# Patient Record
Sex: MALE | Race: WHITE | NOT HISPANIC OR LATINO | Employment: FULL TIME | URBAN - METROPOLITAN AREA
[De-identification: names, ages, dates, MRNs, and addresses within clinical notes are randomized per-mention and may not be internally consistent; named-entity substitution may affect disease eponyms.]

---

## 2017-04-30 ENCOUNTER — HOSPITAL ENCOUNTER (EMERGENCY)
Facility: HOSPITAL | Age: 42
Discharge: HOME | End: 2017-04-30
Attending: EMERGENCY MEDICINE
Payer: COMMERCIAL

## 2017-04-30 VITALS
DIASTOLIC BLOOD PRESSURE: 80 MMHG | SYSTOLIC BLOOD PRESSURE: 146 MMHG | HEIGHT: 72 IN | BODY MASS INDEX: 35.21 KG/M2 | OXYGEN SATURATION: 95 % | RESPIRATION RATE: 20 BRPM | TEMPERATURE: 98 F | WEIGHT: 260 LBS | HEART RATE: 61 BPM

## 2017-04-30 DIAGNOSIS — N49.2 SCROTAL WALL ABSCESS: Primary | ICD-10-CM

## 2017-04-30 PROCEDURE — 99283 EMERGENCY DEPT VISIT LOW MDM: CPT | Mod: 25

## 2017-04-30 PROCEDURE — 25000003 PHARM REV CODE 250: Performed by: EMERGENCY MEDICINE

## 2017-04-30 PROCEDURE — 63600175 PHARM REV CODE 636 W HCPCS: Performed by: EMERGENCY MEDICINE

## 2017-04-30 PROCEDURE — 90715 TDAP VACCINE 7 YRS/> IM: CPT | Performed by: EMERGENCY MEDICINE

## 2017-04-30 PROCEDURE — 55100 DRAINAGE OF SCROTUM ABSCESS: CPT

## 2017-04-30 PROCEDURE — 90471 IMMUNIZATION ADMIN: CPT | Performed by: EMERGENCY MEDICINE

## 2017-04-30 PROCEDURE — 96372 THER/PROPH/DIAG INJ SC/IM: CPT

## 2017-04-30 RX ORDER — LIDOCAINE HYDROCHLORIDE 10 MG/ML
1 INJECTION INFILTRATION; PERINEURAL
Status: COMPLETED | OUTPATIENT
Start: 2017-04-30 | End: 2017-04-30

## 2017-04-30 RX ORDER — FENOFIBRATE 160 MG/1
160 TABLET ORAL DAILY
COMMUNITY
End: 2018-08-01

## 2017-04-30 RX ORDER — HYDROCODONE BITARTRATE AND ACETAMINOPHEN 7.5; 325 MG/1; MG/1
1 TABLET ORAL EVERY 4 HOURS PRN
Qty: 15 TABLET | Refills: 0 | Status: SHIPPED | OUTPATIENT
Start: 2017-04-30 | End: 2018-08-01

## 2017-04-30 RX ORDER — HYDRALAZINE HYDROCHLORIDE 50 MG/1
50 TABLET, FILM COATED ORAL 3 TIMES DAILY
COMMUNITY

## 2017-04-30 RX ORDER — MORPHINE SULFATE 4 MG/ML
8 INJECTION, SOLUTION INTRAMUSCULAR; INTRAVENOUS
Status: COMPLETED | OUTPATIENT
Start: 2017-04-30 | End: 2017-04-30

## 2017-04-30 RX ORDER — ALPRAZOLAM 0.5 MG/1
0.5 TABLET ORAL 2 TIMES DAILY PRN
COMMUNITY

## 2017-04-30 RX ORDER — TIZANIDINE 4 MG/1
4 TABLET ORAL 3 TIMES DAILY PRN
COMMUNITY

## 2017-04-30 RX ORDER — GLYBURIDE 5 MG/1
10 TABLET ORAL 2 TIMES DAILY WITH MEALS
COMMUNITY

## 2017-04-30 RX ORDER — GABAPENTIN 600 MG/1
600 TABLET ORAL 2 TIMES DAILY
COMMUNITY

## 2017-04-30 RX ORDER — IBUPROFEN AND FAMOTIDINE 26.6; 8 MG/1; MG/1
TABLET ORAL 3 TIMES DAILY PRN
COMMUNITY

## 2017-04-30 RX ORDER — SILDENAFIL CITRATE 20 MG/1
20 TABLET ORAL DAILY
COMMUNITY
End: 2018-08-01

## 2017-04-30 RX ORDER — CLINDAMYCIN HYDROCHLORIDE 300 MG/1
300 CAPSULE ORAL EVERY 6 HOURS
COMMUNITY
End: 2018-08-01

## 2017-04-30 RX ORDER — ROSUVASTATIN CALCIUM 40 MG/1
10 TABLET, COATED ORAL NIGHTLY
COMMUNITY

## 2017-04-30 RX ADMIN — MORPHINE SULFATE 8 MG: 4 INJECTION, SOLUTION INTRAMUSCULAR; INTRAVENOUS at 11:04

## 2017-04-30 RX ADMIN — LIDOCAINE HYDROCHLORIDE 1 ML: 10 INJECTION, SOLUTION INFILTRATION; PERINEURAL at 11:04

## 2017-04-30 RX ADMIN — CLOSTRIDIUM TETANI TOXOID ANTIGEN (FORMALDEHYDE INACTIVATED), CORYNEBACTERIUM DIPHTHERIAE TOXOID ANTIGEN (FORMALDEHYDE INACTIVATED), BORDETELLA PERTUSSIS TOXOID ANTIGEN (GLUTARALDEHYDE INACTIVATED), BORDETELLA PERTUSSIS FILAMENTOUS HEMAGGLUTININ ANTIGEN (FORMALDEHYDE INACTIVATED), BORDETELLA PERTUSSIS PERTACTIN ANTIGEN, AND BORDETELLA PERTUSSIS FIMBRIAE 2/3 ANTIGEN 0.5 ML: 5; 2; 2.5; 5; 3; 5 INJECTION, SUSPENSION INTRAMUSCULAR at 11:04

## 2017-04-30 NOTE — ED NOTES
Pt resting in ER stretcher, aaox4, rr e/u, NAD noted. Bed low and locked, call light in reach, side rails up x2. Pt verbalized understanding of status and POC; denies further needs. Will continue to monitor.

## 2017-04-30 NOTE — ED PROVIDER NOTES
"SCRIBE #1 NOTE: I, Petty Holden, am scribing for, and in the presence of, Stanislav Pope MD. I have scribed the entire note.      History      Chief Complaint   Patient presents with    Abscess     Pt states, "I think I have a bad Staph infection on my scrotum."       Review of patient's allergies indicates:  No Known Allergies     HPI   HPI    4/30/2017, 11:00 AM   History obtained from the patient      History of Present Illness: Eddie Roberson Jr. is a 41 y.o. male patient who presents to the Emergency Department for right scrotal abscess which onset gradually 2 weeks ago. Sxs are constant and moderate in severity. Pt reports abscess started as a pimple on his scrotum. Prior tx includes Clindamycin prescribed by PCP 4 days ago. There are no mitigating or exacerbating factors noted. No associated sxs.  Pt denies any fever, N/V/D, and all other sxs at this time. No further complaints or concerns at this time.       Arrival mode: Personal vehicle    PCP: SARAHI Wilkins Jr, NP       Past Medical History:  Past Medical History:   Diagnosis Date    Chronic back pain     Diabetes mellitus     Hypertension     Neuropathy        Past Surgical History:  History reviewed. No pertinent surgical history.      Family History:  History reviewed. No pertinent family history.    Social History:  Social History     Social History Main Topics    Smoking status: Current Every Day Smoker     Packs/day: 0.50     Years: 20.00    Smokeless tobacco: Not on file    Alcohol use Yes      Comment: occasional    Drug use: No    Sexual activity: Not on file       ROS   Review of Systems   Constitutional: Negative for fever.   HENT: Negative for sore throat.    Respiratory: Negative for shortness of breath.    Cardiovascular: Negative for chest pain.   Gastrointestinal: Negative for diarrhea, nausea and vomiting.   Genitourinary: Negative for dysuria.   Musculoskeletal: Negative for back pain.   Skin: Negative for rash.        (+) " scrotal abscess   Neurological: Negative for weakness.   Hematological: Does not bruise/bleed easily.       Physical Exam    Initial Vitals   BP Pulse Resp Temp SpO2   04/30/17 1045 04/30/17 1045 04/30/17 1045 04/30/17 1045 04/30/17 1045   162/93 65 20 98 °F (36.7 °C) 95 %      Physical Exam  Nursing Notes and Vital Signs Reviewed.  Constitutional: Patient is in no acute distress. Awake and alert. Well-developed and well-nourished.  Head: Atraumatic. Normocephalic.  Eyes: PERRL. EOM intact. Conjunctivae are not pale. No scleral icterus.  ENT: Mucous membranes are moist. Oropharynx is clear and symmetric.    Neck: Supple. Full ROM. No lymphadenopathy.  Cardiovascular: Regular rate. Regular rhythm. No murmurs, rubs, or gallops. Distal pulses are 2+ and symmetric.  Pulmonary/Chest: No respiratory distress. Clear to auscultation bilaterally. No wheezing, rales, or rhonchi.  Abdominal: Soft and non-distended.  There is no tenderness.  No rebound, guarding, or rigidity.  Musculoskeletal: Moves all extremities. No obvious deformities. No edema. No calf tenderness.  Skin: Warm and dry. Abscess to right scrotal area.   Neurological:  Alert, awake, and appropriate.  Normal speech.  No acute focal neurological deficits are appreciated.  Psychiatric: Normal affect. Good eye contact. Appropriate in content.    ED Course    I & D - Incision and Drainage  Date/Time: 4/30/2017 11:53 AM  Performed by: LUI BARILLAS.  Authorized by: LUI BARILLAS.   Consent Done: Not Needed  Type: abscess  Body area: anogenital  Location details: scrotal wall  Anesthesia: local infiltration    Anesthesia:  Anesthesia: local infiltration  Local Anesthetic: lidocaine 1% without epinephrine   Patient sedated: no  Scalpel size: 11  Incision type: single straight  Complexity: simple  Drainage: pus  Drainage amount: moderate  Wound treatment: incision,  drainage and  wound packed  Packing material: 1/2 in iodoform gauze  Complications: No  Specimens:  No  Implants: No  Patient tolerance: Patient tolerated the procedure well with no immediate complications        ED Vital Signs:  Vitals:    04/30/17 1045 04/30/17 1245   BP: (!) 162/93 (!) 146/80   Pulse: 65 61   Resp: 20    Temp: 98 °F (36.7 °C)    TempSrc: Oral    SpO2: 95%    Weight: 117.9 kg (260 lb)    Height: 6' (1.829 m)             The Emergency Provider reviewed the vital signs and test results, which are outlined above.    ED Discussion     12: 00 PM : Discussed pt dx and plan of tx. Gave pt all f/u and return to the ED instructions. All questions and concerns were addressed at this time. Pt expresses understanding of information and instructions, and is comfortable with plan to discharge. Pt is stable for discharge      Pre-hypertension/Hypertension: The pt has been informed that they may have pre-hypertension or hypertension based on a blood pressure reading in the ED. I recommend that the pt call the PCP listed on their discharge instructions or a physician of their choice this week to arrange f/u for further evaluation of possible pre-hypertension or hypertension.     I discussed wound care precautions with patient and/or family/caretaker; specifically that all wounds have risk of infection despite efforts to cleanse and debride the wound; and there is a risk of an occult foreign body (and thus increased risk of infection) despite a negative examination.  I discussed with patient need to return for any signs of infection, specifically redness, increased pain, fever, drainage of pus, or any concern, immediately.      ED Medication(s):  Medications   morphine injection 8 mg (8 mg Intramuscular Given 4/30/17 1119)   Tdap vaccine injection 0.5 mL (0.5 mLs Intramuscular Given 4/30/17 1120)   lidocaine HCL 10 mg/ml (1%) injection 1 mL (1 mL Infiltration Given by Other 4/30/17 1118)       Discharge Medication List as of 4/30/2017 12:25 PM      START taking these medications    Details    hydrocodone-acetaminophen 7.5-325mg (NORCO) 7.5-325 mg per tablet Take 1 tablet by mouth every 4 (four) hours as needed for Pain., Starting 4/30/2017, Until Discontinued, Print             Follow-up Information     Follow up with [facility] In 2 days.    Specialty:  Urology    Contact information:    [address] [facility] [address] [phone]        Follow up with [facility].    Specialty:  Emergency Medicine    Why:  As needed, If symptoms worsen    Contact information:    [address] [facility] [address] [phone]            Medical Decision Making              Scribe Attestation:   Scribe #1: I performed the above scribed service and the documentation accurately describes the services I performed. I attest to the accuracy of the note.    Attending:   Physician Attestation Statement for Scribe #1: I, Stanislav Pope MD, personally performed the services described in this documentation, as scribed by Petty Holden, in my presence, and it is both accurate and complete.          Clinical Impression       ICD-10-CM ICD-9-CM   1. Scrotal wall abscess N49.2 608.4       Disposition:   Disposition: Discharged  Condition: Stable         Stanislav Pope MD  04/30/17 1774

## 2017-04-30 NOTE — ED AVS SNAPSHOT
[facility] [address] [place] [address]               Eddie Roberson JrHoda   2017 10:49 AM   ED    Description:  Male : 1975   Department:  [facility]           Your Care was Coordinated By:     Provider Role From To    Stanislav Pope MD Attending Provider 17 1054 --      Reason for Visit     Abscess           Diagnoses this Visit        Comments    Scrotal wall abscess    -  Primary       ED Disposition     ED Disposition Condition Comment    Discharge             To Do List           Follow-up Information     Follow up with [facility] In 2 days.    Specialty:  Urology    Contact information:    [address] [place] [address]  [phone]        Follow up with [facility].    Specialty:  Emergency Medicine    Why:  As needed, If symptoms worsen    Contact information:    [address] [place] [id number] [phone]       These Medications        Disp Refills Start End    hydrocodone-acetaminophen 7.5-325mg (NORCO) 7.5-325 mg per tablet 15 tablet 0 2017     Take 1 tablet by mouth every 4 (four) hours as needed for Pain. - Oral      [facility]     [facility] Care Line - 24/ Assistance  Unless otherwise directed by your provider, please contact [facility], our nurse care line that is available for 24 assistance.     Registered nurses in the [facility] provide: appointment scheduling, clinical advisement, health education, and other advisory services.  Call: [phone] (toll free)               Medications           Message regarding Medications     Verify the changes and/or additions to your medication regime listed below are the same as discussed with your clinician today.  If any of these changes or additions are incorrect, please notify your healthcare provider.        START taking these NEW  medications        Refills    hydrocodone-acetaminophen 7.5-325mg (NORCO) 7.5-325 mg per tablet 0    Sig: Take 1 tablet by mouth every 4 (four) hours as needed for Pain.    Class: Print    Route: Oral      These medications were administered today        Dose Freq    morphine injection 8 mg 8 mg ED 1 Time    Sig: Inject 2 mLs (8 mg total) into the muscle ED 1 Time.    Class: Normal    Route: Intramuscular    Tdap vaccine injection 0.5 mL 0.5 mL ED 1 Time    Sig: Inject 0.5 mLs into the muscle ED 1 Time.    Class: Normal    Route: Intramuscular    lidocaine HCL 10 mg/ml (1%) injection 1 mL 1 mL ED 1 Time    Si mL by Infiltration route ED 1 Time.    Class: Normal    Route: Infiltration           Verify that the below list of medications is an accurate representation of the medications you are currently taking.  If none reported, the list may be blank. If incorrect, please contact your healthcare provider. Carry this list with you in case of emergency.           Current Medications     alprazolam (XANAX) 0.5 MG tablet Take 0.5 mg by mouth 2 (two) times daily as needed for Anxiety.    clindamycin (CLEOCIN) 300 MG capsule Take 300 mg by mouth every 6 (six) hours.    CYCLOBENZAPRINE HCL (CYCLOBENZAPRINE ORAL) Take by mouth.    fenofibrate 160 MG Tab Take 160 mg by mouth once daily.    gabapentin (NEURONTIN) 600 MG tablet Take 600 mg by mouth 2 (two) times daily.    glyBURIDE (DIABETA) 5 MG tablet Take 10 mg by mouth 2 (two) times daily with meals.    hydrALAZINE (APRESOLINE) 50 MG tablet Take 50 mg by mouth 3 (three) times daily.    hydrocodone-acetaminophen 7.5-325mg (NORCO) 7.5-325 mg per tablet Take 1 tablet by mouth every 4 (four) hours as needed for Pain.    ibuprofen-famotidine (DUEXIS) 800-26.6 mg Tab Take by mouth 3 (three) times daily as needed (pain).    rosuvastatin (CRESTOR) 40 MG Tab Take 10 mg by mouth every evening.    sildenafil (REVATIO) 20 mg Tab Take 20 mg by mouth once daily.    tizanidine  (ZANAFLEX) 4 MG tablet Take 4 mg by mouth 3 (three) times daily as needed.           Clinical Reference Information           Your Vitals Were     BP Pulse Temp Resp Height Weight    162/93 (BP Location: Right arm, Patient Position: Sitting) 65 98 °F (36.7 °C) (Oral) 20 6' (1.829 m) 117.9 kg (260 lb)    SpO2 BMI             95% 35.26 kg/m2         Allergies as of 4/30/2017     No Known Allergies      Immunizations Administered on Date of Encounter - 4/30/2017     Name Date Dose VIS Date Route    TDAP 4/30/2017 0.5 mL 2/24/2015 Intramuscular      ED Micro, Lab, POCT     None      ED Imaging Orders     None      Discharge References/Attachments     ABSCESS, INCISION AND DRAINAGE (ENGLISH)      [facility] Sign-Up     Activating your [facility] account is as easy as 1-2-3!     1) Visit [email or web address], select Sign Up Now, enter this activation code and your date of birth, then select Next.  [email or web address]  Expires: 6/14/2017 12:25 PM      2) Create a username and password to use when you visit [facility] in the future and select a security question in case you lose your password and select Next.    3) Enter your e-mail address and click Sign Up!    Additional Information  If you have questions, please e-mail [email or web address] or call [phone] to talk to our [facility] staff. Remember, [facility] is NOT to be used for urgent needs. For medical emergencies, dial 911.         Smoking Cessation     If you would like to quit smoking:   You may be eligible for free services if you are a [place] resident and started smoking cigarettes before September 1, 1988.  Call the Smoking Cessation Trust (SCT) toll free at (703) 438-4751 or (152) 952-8898.   Call 7-800-QUIT-NOW if you do not meet the above criteria.   Contact us via email: [email or web address]   View our website for more information: [email or web address]         [facility] complies with applicable Federal civil rights laws and does  not discriminate on the basis of race, color, national origin, age, disability, or sex.        Language Assistance Services     ATTENTION: Language assistance services are available, free of charge. Please call [phone].      ATENCIÓN: Si duncan chaudhry, tiene a brantley disposición servicios gratuitos de asistencia lingüística. Llame al [phone].     CHÚ Ý: N?u b?n nói Ti?ng Vi?t, có các d?ch v? h? tr? ngôn ng? mi?n phí dành cho b?n. G?i s? [phone].

## 2017-05-01 ENCOUNTER — TELEPHONE (OUTPATIENT)
Dept: UROLOGY | Facility: CLINIC | Age: 42
End: 2017-05-01

## 2017-05-01 NOTE — TELEPHONE ENCOUNTER
----- Message from Georgette Garner sent at 5/1/2017  2:08 PM CDT -----  Contact: Patient  Patient called to schedule an ER F/U ([facility]) for scrotal abscess. He was told to f/u in 2-3 days.    He can be contacted at [phone].    Thanks,  Georgette

## 2017-05-01 NOTE — TELEPHONE ENCOUNTER
Spoke with patient and scheduled ER follow up for scrotal abscess. Patient believes he may be running fever. Advised patient to go to ER if his fever reaches 101 or greater and is not relieved by Tylenol. Patient states understanding.

## 2017-05-02 ENCOUNTER — OFFICE VISIT (OUTPATIENT)
Dept: UROLOGY | Facility: CLINIC | Age: 42
End: 2017-05-02
Payer: COMMERCIAL

## 2017-05-02 VITALS
BODY MASS INDEX: 35.22 KG/M2 | WEIGHT: 259.69 LBS | HEART RATE: 106 BPM | SYSTOLIC BLOOD PRESSURE: 150 MMHG | DIASTOLIC BLOOD PRESSURE: 102 MMHG

## 2017-05-02 DIAGNOSIS — N49.2 SCROTAL ABSCESS: Primary | ICD-10-CM

## 2017-05-02 LAB
BILIRUB SERPL-MCNC: NORMAL MG/DL
BLOOD URINE, POC: NORMAL
COLOR, POC UA: NORMAL
GLUCOSE UR QL STRIP: 500
KETONES UR QL STRIP: NORMAL
LEUKOCYTE ESTERASE URINE, POC: NORMAL
NITRITE, POC UA: NORMAL
PH, POC UA: 5
PROTEIN, POC: 100
SPECIFIC GRAVITY, POC UA: 1.02
UROBILINOGEN, POC UA: NORMAL

## 2017-05-02 PROCEDURE — 99999 PR PBB SHADOW E&M-EST. PATIENT-LVL II: CPT | Mod: PBBFAC,,, | Performed by: UROLOGY

## 2017-05-02 PROCEDURE — 99244 OFF/OP CNSLTJ NEW/EST MOD 40: CPT | Mod: 25,S$GLB,, | Performed by: UROLOGY

## 2017-05-02 PROCEDURE — 81002 URINALYSIS NONAUTO W/O SCOPE: CPT | Mod: S$GLB,,, | Performed by: UROLOGY

## 2017-05-02 RX ORDER — SULFAMETHOXAZOLE AND TRIMETHOPRIM 800; 160 MG/1; MG/1
1 TABLET ORAL 2 TIMES DAILY
Qty: 28 TABLET | Refills: 0 | Status: SHIPPED | OUTPATIENT
Start: 2017-05-02 | End: 2017-05-16

## 2017-05-02 NOTE — MR AVS SNAPSHOT
[facility]  [address] [facility] [address]  Phone: [phone]  Fax: [phone]                  Eddie Roberson Jr.   2017 3:00 PM   Office Visit    Description:  Male : 1975   Provider:  Julio Linder IV, MD   Department:  [facility]           Diagnoses this Visit        Comments    Scrotal abscess    -  Primary            To Do List           Future Appointments        Provider Department Dept Phone    2017 4:40 PM Julio Linder IV, MD [facility] [phone]      Goals (5 Years of Data)     None       These Medications        Disp Refills Start End    sulfamethoxazole-trimethoprim 800-160mg (BACTRIM DS) 800-160 mg Tab 28 tablet 0 2017    Take 1 tablet by mouth 2 (two) times daily. - Oral    Pharmacy: [facility] - [address] #: [phone]         [facility]     [facility] Nurse Care Line -  Assistance  Unless otherwise directed by your provider, please contact [facility], our nurse care line that is available for  assistance.     Registered nurses in the [facility] provide: appointment scheduling, clinical advisement, health education, and other advisory services.  Call: [phone] (toll free)               Medications           Message regarding Medications     Verify the changes and/or additions to your medication regime listed below are the same as discussed with your clinician today.  If any of these changes or additions are incorrect, please notify your healthcare provider.        START taking these NEW medications        Refills    sulfamethoxazole-trimethoprim 800-160mg (BACTRIM DS) 800-160 mg Tab 0    Sig: Take 1 tablet by mouth 2 (two) times daily.    Class: Normal    Route: Oral           Verify that the below list of medications is an accurate representation of the medications you are currently taking.  If none  reported, the list may be blank. If incorrect, please contact your healthcare provider. Carry this list with you in case of emergency.           Current Medications     alprazolam (XANAX) 0.5 MG tablet Take 0.5 mg by mouth 2 (two) times daily as needed for Anxiety.    clindamycin (CLEOCIN) 300 MG capsule Take 300 mg by mouth every 6 (six) hours.    CYCLOBENZAPRINE HCL (CYCLOBENZAPRINE ORAL) Take by mouth.    fenofibrate 160 MG Tab Take 160 mg by mouth once daily.    gabapentin (NEURONTIN) 600 MG tablet Take 600 mg by mouth 2 (two) times daily.    glyBURIDE (DIABETA) 5 MG tablet Take 10 mg by mouth 2 (two) times daily with meals.    hydrALAZINE (APRESOLINE) 50 MG tablet Take 50 mg by mouth 3 (three) times daily.    hydrocodone-acetaminophen 7.5-325mg (NORCO) 7.5-325 mg per tablet Take 1 tablet by mouth every 4 (four) hours as needed for Pain.    ibuprofen-famotidine (DUEXIS) 800-26.6 mg Tab Take by mouth 3 (three) times daily as needed (pain).    rosuvastatin (CRESTOR) 40 MG Tab Take 10 mg by mouth every evening.    sildenafil (REVATIO) 20 mg Tab Take 20 mg by mouth once daily.    tizanidine (ZANAFLEX) 4 MG tablet Take 4 mg by mouth 3 (three) times daily as needed.    sulfamethoxazole-trimethoprim 800-160mg (BACTRIM DS) 800-160 mg Tab Take 1 tablet by mouth 2 (two) times daily.           Clinical Reference Information           Your Vitals Were     BP Pulse Weight BMI       150/102 (BP Location: Left arm, Patient Position: Sitting, BP Method: Automatic) 106 117.8 kg (259 lb 11.2 oz) 35.22 kg/m2       Blood Pressure          Most Recent Value    BP  (!)  150/102      Allergies as of 5/2/2017     No Known Allergies      Immunizations Administered on Date of Encounter - 5/2/2017     None      Orders Placed During Today's Visit      Normal Orders This Visit    POCT urine dipstick without microscope          5/2/2017  3:41 PM - Kayla Sim LPN      Component Results     Component    Color, UA    yellow, clear     Spec Grav UA    1.020    pH, UA    5    WBC, UA    neg    Nitrite, UA    neg    Protein    100    Glucose, UA    500    Ketones, UA    neg    Urobilinogen, UA    neg    Bilirubin    neg    Blood, UA    neg            [facility] Sign-Up     Activating your [facility] account is as easy as 1-2-3!     1) Visit [email or web address], select Sign Up Now, enter this activation code and your date of birth, then select Next.  [email or web address]  Expires: 6/14/2017 12:25 PM      2) Create a username and password to use when you visit [facility] in the future and select a security question in case you lose your password and select Next.    3) Enter your e-mail address and click Sign Up!    Additional Information  If you have questions, please e-mail [email or web address] or call [phone] to talk to our [facility] staff. Remember, [facility] is NOT to be used for urgent needs. For medical emergencies, dial 911.         Smoking Cessation     If you would like to quit smoking:   You may be eligible for free services if you are a [place] resident and started smoking cigarettes before September 1, 1988.  Call the Smoking Cessation Trust ([facility]) toll free at (365) 172-4547 or (350) 890-7716.   Call 0-824-QUIT-NOW if you do not meet the above criteria.   Contact us via email: [email or web address]   View our website for more information: [email or web address]        Language Assistance Services     ATTENTION: Language assistance services are available, free of charge. Please call [phone].      ATENCIÓN: Si habla español, tiene a brantley disposición servicios gratuitos de asistencia lingüística. Llame al [phone].     CHÚ Ý: N?u b?n nói Ti?ng Vi?t, có các d?ch v? h? tr? ngôn ng? mi?n phí dành cho b?n. G?i s? [phone].         [facility] complies with applicable Federal civil rights laws and does not discriminate on the basis of race, color, national origin, age, disability, or sex.

## 2017-05-02 NOTE — PROGRESS NOTES
Chief Complaint: Scrotal Abscess    HPI:   5/2/17: 42 yo man had a small infection and then 3d ago it was acutely worse and had the scrotal abscess I&D with packing.  Took the packing out not repacking.  Was taking clindamycin given by PCP and it didn't work but no new abx given in ER.  First problem like this.  No abd/pelvic pain and no exac/rel factors.  No hematuria.  No urolithiasis.  No urinary bother.  No  history.  Normal sexual function.    Allergies:  Review of patient's allergies indicates no known allergies.    Medications:  has a current medication list which includes the following prescription(s): alprazolam, clindamycin, cyclobenzaprine hcl, fenofibrate, gabapentin, glyburide, hydralazine, hydrocodone-acetaminophen 7.5-325mg, ibuprofen-famotidine, rosuvastatin, sildenafil, and tizanidine.    Review of Systems:  General: No fever, chills, fatigability, or weight loss.  Skin: No rashes, itching, or changes in color or texture of skin.  Chest: Denies JHAVERI, cyanosis, wheezing, cough, and sputum production.  Abdomen: Appetite fine. No weight loss. Denies diarrhea, abdominal pain, hematemesis, or blood in stool.  Musculoskeletal: No joint stiffness or swelling. Denies back pain.  : As above.  All other review of systems negative.    PMH:   has a past medical history of Chronic back pain; Diabetes mellitus; Hypertension; and Neuropathy.    PSH:   has no past surgical history on file.    FamHx: family history is not on file.    SocHx:  reports that he has been smoking.  He has a 10.00 pack-year smoking history. He does not have any smokeless tobacco history on file. He reports that he drinks alcohol. He reports that he does not use illicit drugs.      Physical Exam:  Vitals:    05/02/17 1527   BP: (!) 150/102   Pulse: 106     General: A&Ox3, no apparent distress, no deformities  Neck: No masses, normal thyroid  Lungs: normal inspiration, no use of accessory muscles  Heart: normal pulse, no  arrhythmias  Abdomen: Soft, NT, ND, no masses, no hernias, no hepatosplenomegaly  Lymphatic: Neck and groin nodes negative  Skin: The skin is warm and dry. No jaundice.  Ext: No c/c/e.  : Test desc el, no abnormalities of epididymus. Penis normal, with normal penile and scrotal skin. Meatus normal.  2 cm area of induration but no fluctuance with packing removed by me; ready to keep it out.    Labs/Studies: Urinalysis performed in clinic, summary: UA normal exc 100 prot and 500 glucose    Impression/Plan:   1. Change Abx to bactrim x2wks with RTC then to recheck.

## 2017-11-06 ENCOUNTER — TELEPHONE (OUTPATIENT)
Dept: RHEUMATOLOGY | Facility: CLINIC | Age: 42
End: 2017-11-06

## 2017-11-06 NOTE — TELEPHONE ENCOUNTER
Records requested for an appointment with Dr. Alcantara to be reviewed. Mr. Roberson states understanding. Fax number provided.

## 2017-11-06 NOTE — TELEPHONE ENCOUNTER
----- Message from Mirna Villagran sent at 11/6/2017  9:05 AM CST -----  Contact: pT  Pt states that he has a certain type of arthritis and was referred to see him. Please give pt a call at ..[phone] (home)

## 2017-11-22 NOTE — LETTER
May 2, 2017        Stanislav Pope MD  [address] [place] [address]             [facility]  [address] [facility]  [place] [address]  Phone: [phone]  Fax: [phone]   Patient: Eddie Roberson Jr.   MR Number: [id number]   YOB: 1975   Date of Visit: 5/2/2017       Dear Dr. Pope:    Thank you for referring Eddie Roberson to me for evaluation. Below are the relevant portions of my assessment and plan of care.            If you have questions, please do not hesitate to call me. I look forward to following Eddie along with you.    Sincerely,      Julio Linder IV, MD           CC  No Recipients       
abrasion with normal ROM

## 2018-08-01 ENCOUNTER — HOSPITAL ENCOUNTER (EMERGENCY)
Facility: HOSPITAL | Age: 43
Discharge: HOME | End: 2018-08-01
Attending: EMERGENCY MEDICINE
Payer: COMMERCIAL

## 2018-08-01 VITALS
DIASTOLIC BLOOD PRESSURE: 77 MMHG | TEMPERATURE: 99 F | BODY MASS INDEX: 32.9 KG/M2 | HEART RATE: 85 BPM | RESPIRATION RATE: 16 BRPM | HEIGHT: 73 IN | WEIGHT: 248.25 LBS | SYSTOLIC BLOOD PRESSURE: 126 MMHG | OXYGEN SATURATION: 98 %

## 2018-08-01 DIAGNOSIS — I10 ESSENTIAL HYPERTENSION: ICD-10-CM

## 2018-08-01 DIAGNOSIS — E11.00 UNCONTROLLED TYPE 2 DIABETES MELLITUS WITH HYPEROSMOLARITY WITHOUT COMA, WITH LONG-TERM CURRENT USE OF INSULIN: ICD-10-CM

## 2018-08-01 DIAGNOSIS — Z79.4 UNCONTROLLED TYPE 2 DIABETES MELLITUS WITH HYPEROSMOLARITY WITHOUT COMA, WITH LONG-TERM CURRENT USE OF INSULIN: ICD-10-CM

## 2018-08-01 DIAGNOSIS — R73.9 HYPERGLYCEMIA: ICD-10-CM

## 2018-08-01 DIAGNOSIS — M54.32 SCIATICA OF LEFT SIDE: Primary | ICD-10-CM

## 2018-08-01 DIAGNOSIS — Z72.0 TOBACCO USE: ICD-10-CM

## 2018-08-01 LAB
ALBUMIN SERPL BCP-MCNC: 4.2 G/DL
ALP SERPL-CCNC: 139 U/L
ALT SERPL W/O P-5'-P-CCNC: 22 U/L
ANION GAP SERPL CALC-SCNC: 12 MMOL/L
ANISOCYTOSIS BLD QL SMEAR: SLIGHT
AST SERPL-CCNC: 14 U/L
B-OH-BUTYR BLD STRIP-SCNC: 0 MMOL/L
BACTERIA #/AREA URNS HPF: NORMAL /HPF
BASOPHILS # BLD AUTO: 0.01 K/UL
BASOPHILS NFR BLD: 0.1 %
BILIRUB SERPL-MCNC: 0.3 MG/DL
BILIRUB UR QL STRIP: NEGATIVE
BUN SERPL-MCNC: 23 MG/DL
CALCIUM SERPL-MCNC: 9.9 MG/DL
CHLORIDE SERPL-SCNC: 107 MMOL/L
CLARITY UR: CLEAR
CO2 SERPL-SCNC: 17 MMOL/L
COLOR UR: YELLOW
CREAT SERPL-MCNC: 1.4 MG/DL
DACRYOCYTES BLD QL SMEAR: ABNORMAL
DIFFERENTIAL METHOD: ABNORMAL
EOSINOPHIL # BLD AUTO: 0.1 K/UL
EOSINOPHIL NFR BLD: 0.6 %
ERYTHROCYTE [DISTWIDTH] IN BLOOD BY AUTOMATED COUNT: 12.7 %
EST. GFR  (AFRICAN AMERICAN): >60 ML/MIN/1.73 M^2
EST. GFR  (NON AFRICAN AMERICAN): >60 ML/MIN/1.73 M^2
GLUCOSE SERPL-MCNC: 419 MG/DL
GLUCOSE UR QL STRIP: ABNORMAL
HCT VFR BLD AUTO: 46.9 %
HGB BLD-MCNC: 17.3 G/DL
HGB UR QL STRIP: NEGATIVE
HYALINE CASTS #/AREA URNS LPF: 0 /LPF
KETONES UR QL STRIP: NEGATIVE
LEUKOCYTE ESTERASE UR QL STRIP: NEGATIVE
LYMPHOCYTES # BLD AUTO: 1.9 K/UL
LYMPHOCYTES NFR BLD: 20.5 %
MCH RBC QN AUTO: 31.2 PG
MCHC RBC AUTO-ENTMCNC: 36.9 G/DL
MCV RBC AUTO: 85 FL
MICROSCOPIC COMMENT: NORMAL
MONOCYTES # BLD AUTO: 0.6 K/UL
MONOCYTES NFR BLD: 7 %
NEUTROPHILS # BLD AUTO: 6.5 K/UL
NEUTROPHILS NFR BLD: 72.1 %
NITRITE UR QL STRIP: NEGATIVE
OVALOCYTES BLD QL SMEAR: ABNORMAL
PH UR STRIP: 6 [PH] (ref 5–8)
PLATELET # BLD AUTO: 161 K/UL
PLATELET BLD QL SMEAR: ABNORMAL
PMV BLD AUTO: 10.2 FL
POCT GLUCOSE: 225 MG/DL (ref 70–110)
POCT GLUCOSE: 282 MG/DL (ref 70–110)
POCT GLUCOSE: 317 MG/DL (ref 70–110)
POCT GLUCOSE: 350 MG/DL (ref 70–110)
POIKILOCYTOSIS BLD QL SMEAR: SLIGHT
POTASSIUM SERPL-SCNC: 4.4 MMOL/L
PROT SERPL-MCNC: 7.4 G/DL
PROT UR QL STRIP: ABNORMAL
RBC # BLD AUTO: 5.55 M/UL
RBC #/AREA URNS HPF: 0 /HPF (ref 0–4)
SODIUM SERPL-SCNC: 136 MMOL/L
SP GR UR STRIP: 1.01 (ref 1–1.03)
SPHEROCYTES BLD QL SMEAR: ABNORMAL
SQUAMOUS #/AREA URNS HPF: 1 /HPF
URN SPEC COLLECT METH UR: ABNORMAL
UROBILINOGEN UR STRIP-ACNC: NEGATIVE EU/DL
WBC # BLD AUTO: 9.03 K/UL
WBC #/AREA URNS HPF: 0 /HPF (ref 0–5)
YEAST URNS QL MICRO: NORMAL

## 2018-08-01 PROCEDURE — 96374 THER/PROPH/DIAG INJ IV PUSH: CPT

## 2018-08-01 PROCEDURE — 81000 URINALYSIS NONAUTO W/SCOPE: CPT

## 2018-08-01 PROCEDURE — 85025 COMPLETE CBC W/AUTO DIFF WBC: CPT

## 2018-08-01 PROCEDURE — 82010 KETONE BODYS QUAN: CPT

## 2018-08-01 PROCEDURE — 80053 COMPREHEN METABOLIC PANEL: CPT

## 2018-08-01 PROCEDURE — 25000003 PHARM REV CODE 250: Performed by: EMERGENCY MEDICINE

## 2018-08-01 PROCEDURE — 36415 COLL VENOUS BLD VENIPUNCTURE: CPT

## 2018-08-01 PROCEDURE — 96361 HYDRATE IV INFUSION ADD-ON: CPT

## 2018-08-01 PROCEDURE — 63600175 PHARM REV CODE 636 W HCPCS: Performed by: EMERGENCY MEDICINE

## 2018-08-01 PROCEDURE — 99284 EMERGENCY DEPT VISIT MOD MDM: CPT | Mod: 25

## 2018-08-01 PROCEDURE — 82962 GLUCOSE BLOOD TEST: CPT

## 2018-08-01 RX ORDER — INSULIN DEGLUDEC 100 U/ML
INJECTION, SOLUTION SUBCUTANEOUS
COMMUNITY

## 2018-08-01 RX ORDER — HYDROCODONE BITARTRATE AND ACETAMINOPHEN 7.5; 325 MG/1; MG/1
1 TABLET ORAL EVERY 6 HOURS PRN
Qty: 12 TABLET | Refills: 0 | Status: SHIPPED | OUTPATIENT
Start: 2018-08-01

## 2018-08-01 RX ORDER — CELECOXIB 50 MG/1
50 CAPSULE ORAL 3 TIMES DAILY
COMMUNITY

## 2018-08-01 RX ORDER — HYDROCODONE BITARTRATE AND ACETAMINOPHEN 5; 325 MG/1; MG/1
1 TABLET ORAL
Status: COMPLETED | OUTPATIENT
Start: 2018-08-01 | End: 2018-08-01

## 2018-08-01 RX ADMIN — INSULIN HUMAN 3 UNITS: 100 INJECTION, SOLUTION PARENTERAL at 07:08

## 2018-08-01 RX ADMIN — SODIUM CHLORIDE 2000 ML: 0.9 INJECTION, SOLUTION INTRAVENOUS at 05:08

## 2018-08-01 RX ADMIN — HYDROCODONE BITARTRATE AND ACETAMINOPHEN 1 TABLET: 5; 325 TABLET ORAL at 05:08

## 2018-08-01 NOTE — ED PROVIDER NOTES
SCRIBE #1 NOTE: I, Annie Pope, am scribing for, and in the presence of, Kiersten Jackson DO. I have scribed the entire note.      History      Chief Complaint   Patient presents with    Hyperglycemia     reports BG running over 400. Type II DM    Hip Pain     sharp, stabbing L hip pain       Review of patient's allergies indicates:   Allergen Reactions    Sulfa (sulfonamide antibiotics)         HPI   HPI    8/1/2018, 4:48 PM   History obtained from the patient      History of Present Illness: Eddie Roberson Jr. is a 43 y.o. male patient who presents to the Emergency Department for hyperglycemia which onset gradually 5 days ago. Symptoms are constant and moderate in severity. No mitigating or exacerbating factors reported.  Patient notes that he has been compliant with his diabetic medication.  He notes that he has had increased thirst, polydipsia as well. Nothing makes better, nothing makes it worse.  Associated sxs include L hip pain that radiates to the lower back and down the LLE which onset when pt woke up this AM, light-headedness, dizziness, increased thirst, and increased urine output. Pt states the hip pain is a 7 out of a scale of 10.  Pain is described as sharp.  Patient denies any any changes in diet, abd pain, hematochezia, dysuria, hematuria, N/V/D, SOB, CP, perirectal numbness, numbness or weakness to the lower extremity, fever, unexpected weight changes, prior history of cancer and all other sxs at this time. Prior Tx includes ibuprofen, Tylenol, and Excedrin for the hip pain. No further complaints or concerns at this time.     Arrival mode: Personal vehicle      PCP: SARAHI Wilkins Jr, NP       Past Medical History:  Past Medical History:   Diagnosis Date    Chronic back pain     Diabetes mellitus     Hypertension     Neuropathy        Past Surgical History:  History reviewed. No pertinent surgical history.    Family History:  History reviewed. No pertinent family history.    Social  History:  Social History     Social History Main Topics    Smoking status: Current Every Day Smoker     Packs/day: 0.50     Years: 20.00    Smokeless tobacco: Unknown    Alcohol use Yes      Comment: occasional    Drug use: No    Sexual activity: Unknown       ROS   Review of Systems   Constitutional: Negative for chills, diaphoresis and fever.        (+) increased thirst   HENT: Negative for congestion, rhinorrhea and sore throat.    Respiratory: Negative for cough and shortness of breath.    Cardiovascular: Negative for chest pain.   Gastrointestinal: Negative for abdominal pain, blood in stool, diarrhea, nausea and vomiting.   Genitourinary: Negative for dysuria, frequency and hematuria.        (+) increased urine output   Musculoskeletal: Positive for arthralgias (L hip), back pain and myalgias (LLE). Negative for neck pain and neck stiffness.   Skin: Negative for rash.   Neurological: Positive for dizziness and light-headedness. Negative for weakness.   Hematological: Does not bruise/bleed easily.     Physical Exam      Initial Vitals [08/01/18 1554]   BP Pulse Resp Temp SpO2   (!) 160/99 (!) 122 18 98.8 °F (37.1 °C) 98 %      MAP       --          Physical Exam  Nursing Notes and Vital Signs Reviewed.  Constitutional: Patient is in no acute distress. Well-developed and well-nourished.  Head: Atraumatic. Normocephalic.  Eyes: PERRL. EOM intact. Conjunctivae are not pale. No scleral icterus.  ENT: Mucous membranes are dry. Oropharynx is clear and symmetric.    Cardiovascular: Regular rate. Regular rhythm. No murmurs, rubs, or gallops. Distal pulses are 2+ and symmetric.  Pulmonary/Chest: No respiratory distress. Clear to auscultation bilaterally. No wheezing or rales.  Abdominal: Soft and non-distended.  There is no tenderness.  No rebound, guarding, or rigidity.   Musculoskeletal: Moves all extremities. No obvious deformities. No edema. No calf tenderness. Tenderness over SI joint.  Neck: Supple. No  "cervical midline bony tenderness, deformities, or step-offs.   Back: No midline bony tenderness, deformities, or step-offs of the T-spine or L-spine. Skin appears normal without abrasions or bruising. No erythema, induration, or fluctuance.   Neurological: Awake and alert. Appropriate for age. No strength deficit; equal and 5/5 in bilateral upper and lower extremities. No light touch sensory deficit.  Antalgic gait. Dorsal and plantar flexion is 5/5.No acute focal neurological deficits noted.  Skin: Warm and dry.  Psychiatric: Normal affect. Good eye contact. Appropriate in content.    ED Course    Procedures  ED Vital Signs:  Vitals:    08/01/18 1554 08/01/18 1705 08/01/18 1802 08/01/18 1900   BP: (!) 160/99 133/75 130/83 128/75   Pulse: (!) 122 98 80 90   Resp: 18 16 16    Temp: 98.8 °F (37.1 °C)      TempSrc: Oral      SpO2: 98% 97% 97% 98%   Weight: 112.6 kg (248 lb 3.8 oz)      Height: 6' 1" (1.854 m)          Abnormal Lab Results:  Labs Reviewed   CBC W/ AUTO DIFFERENTIAL - Abnormal; Notable for the following:        Result Value    MCH 31.2 (*)     MCHC 36.9 (*)     All other components within normal limits   COMPREHENSIVE METABOLIC PANEL - Abnormal; Notable for the following:     CO2 17 (*)     Glucose 419 (*)     BUN, Bld 23 (*)     Alkaline Phosphatase 139 (*)     All other components within normal limits   URINALYSIS - Abnormal; Notable for the following:     Protein, UA 1+ (*)     Glucose, UA 3+ (*)     All other components within normal limits   POCT GLUCOSE - Abnormal; Notable for the following:     POCT Glucose 350 (*)     All other components within normal limits   BETA - HYDROXYBUTYRATE, SERUM   URINALYSIS MICROSCOPIC   POCT GLUCOSE MONITORING CONTINUOUS        All Lab Results:  Results for orders placed or performed during the hospital encounter of 08/01/18   CBC auto differential   Result Value Ref Range    WBC 9.03 3.90 - 12.70 K/uL    RBC 5.55 4.60 - 6.20 M/uL    Hemoglobin 17.3 14.0 - 18.0 g/dL "    Hematocrit 46.9 40.0 - 54.0 %    MCV 85 82 - 98 fL    MCH 31.2 (H) 27.0 - 31.0 pg    MCHC 36.9 (H) 32.0 - 36.0 g/dL    RDW 12.7 11.5 - 14.5 %    Platelets 161 150 - 350 K/uL    MPV 10.2 9.2 - 12.9 fL    Gran # (ANC) 6.5 1.8 - 7.7 K/uL    Lymph # 1.9 1.0 - 4.8 K/uL    Mono # 0.6 0.3 - 1.0 K/uL    Eos # 0.1 0.0 - 0.5 K/uL    Baso # 0.01 0.00 - 0.20 K/uL    Gran% 72.1 38.0 - 73.0 %    Lymph% 20.5 18.0 - 48.0 %    Mono% 7.0 4.0 - 15.0 %    Eosinophil% 0.6 0.0 - 8.0 %    Basophil% 0.1 0.0 - 1.9 %    Platelet Estimate Appears normal     Aniso Slight     Poik Slight     Ovalocytes Occasional     Tear Drop Cells Occasional     Spherocytes Occasional     Differential Method Automated    Comprehensive metabolic panel   Result Value Ref Range    Sodium 136 136 - 145 mmol/L    Potassium 4.4 3.5 - 5.1 mmol/L    Chloride 107 95 - 110 mmol/L    CO2 17 (L) 23 - 29 mmol/L    Glucose 419 (H) 70 - 110 mg/dL    BUN, Bld 23 (H) 6 - 20 mg/dL    Creatinine 1.4 0.5 - 1.4 mg/dL    Calcium 9.9 8.7 - 10.5 mg/dL    Total Protein 7.4 6.0 - 8.4 g/dL    Albumin 4.2 3.5 - 5.2 g/dL    Total Bilirubin 0.3 0.1 - 1.0 mg/dL    Alkaline Phosphatase 139 (H) 55 - 135 U/L    AST 14 10 - 40 U/L    ALT 22 10 - 44 U/L    Anion Gap 12 8 - 16 mmol/L    eGFR if African American >60 >60 mL/min/1.73 m^2    eGFR if non African American >60 >60 mL/min/1.73 m^2   Beta - Hydroxybutyrate, Serum   Result Value Ref Range    Beta-Hydroxybutyrate 0.0 0.0 - 0.5 mmol/L   Urinalysis   Result Value Ref Range    Specimen UA Urine, Clean Catch     Color, UA Yellow Yellow, Straw, Ragini    Appearance, UA Clear Clear    pH, UA 6.0 5.0 - 8.0    Specific Gravity, UA 1.015 1.005 - 1.030    Protein, UA 1+ (A) Negative    Glucose, UA 3+ (A) Negative    Ketones, UA Negative Negative    Bilirubin (UA) Negative Negative    Occult Blood UA Negative Negative    Nitrite, UA Negative Negative    Urobilinogen, UA Negative <2.0 EU/dL    Leukocytes, UA Negative Negative   Urinalysis  Microscopic   Result Value Ref Range    RBC, UA 0 0 - 4 /hpf    WBC, UA 0 0 - 5 /hpf    Bacteria, UA None None-Occ /hpf    Yeast, UA None None    Squam Epithel, UA 1 /hpf    Hyaline Casts, UA 0 0-1/lpf /lpf    Microscopic Comment SEE COMMENT    POCT glucose   Result Value Ref Range    POCT Glucose 350 (H) 70 - 110 mg/dL       Imaging Results:  Imaging Results          X-Ray Hip 2 View Left (Final result)  Result time 08/01/18 17:50:37    Final result by Arash Levy MD (08/01/18 17:50:37)                 Impression:      Unremarkable exam.  No fractures or significant arthritic changes.      Electronically signed by: Arash Levy MD  Date:    08/01/2018  Time:    17:50             Narrative:    EXAMINATION:  XR HIP 2 VIEW LEFT    CLINICAL HISTORY:  left hip pain;    TECHNIQUE:  Routine radiographs obtained.    COMPARISON:  None    FINDINGS:  Negative for acute fracture or dislocation.    No significant arthritic changes.    Small sclerotic foci right acetabulum likely bone islands.                                        The Emergency Provider reviewed the vital signs and test results, which are outlined above.    ED Discussion         7:28 PM: Reassessed pt at this time.  Pt states his condition has improved at this time.  Patient able to ambulate without difficulty.  Discussed with pt all pertinent ED information and results. Discussed pt dx and plan of tx. Gave pt all f/u and return to the ED instructions. All questions and concerns were addressed at this time. Pt expresses understanding of information and instructions, and is comfortable with plan to discharge. Pt is stable for discharge.    Results for JOS PEREZ JR. (MRN [id number]) as of 8/2/2018 01:50   Ref. Range 8/1/2018 19:14 8/1/2018 20:02   POCT Glucose Latest Ref Range: 70 - 110 mg/dL 282 (H) 225 (H)       I discussed with patient and/or family/caretaker that evaluation in the ED does not suggest any emergent or life threatening medical  conditions requiring immediate intervention beyond what was provided in the ED, and I believe patient is safe for discharge.  Regardless, an unremarkable evaluation in the ED does not preclude the development or presence of a serious of life threatening condition. As such, patient was instructed to return immediately for any worsening or change in current symptoms.      Regarding TOBACCO USE DISORDER, patient was encouraged to:  make changes to lifestyle and habits to help reduce craving for cigarettes; satisfy oral habits in other ways (eat celery or another low-calorie snack, chew sugarless gum, etc.); only frequent public places and restaurants where smoking is prohibited or restricted; eat regular meals and avoid sweet snacks or candy; and to exercise more frequently.  Patient was educated on setting goals for quitting and complications associated with tobacco use. Resources were provided to patient for smoking cessation opportunities offered in the community.     7:09 PM   consulted.  I discussed with the patient/guardian regarding the the quantity of the opioid.  I discussed the patient/guardian's option to fill the prescription in a lesser quantity.   I also discussed with the patient/guardian the risks associated with the opioid.    07/10/2018 5  02/09/2018 ALPRAZOLAM 0.5 MG TABLET 90 30 [id number] OT [facility] 3  [facility] [place]   07/09/2018 5  07/09/2018 DEXTROAMP-AMPHETAMIN 30 MG TAB 60 30 [id number] OT [facility]  [facility] [place]   06/07/2018 5  05/08/2018 DEXTROAMP-AMPHETAMIN 30 MG TAB 60 30 [id number] OT [facility]  [facility] [place]   06/01/2018 5  02/09/2018 ALPRAZOLAM 0.5 MG TABLET 90 30 [id number] OT [facility] 2  [facility] [place]   05/08/2018 5  05/08/2018 HYDROCODONE-ACETAMIN  MG 20 5 [id number] OT [facility] 0 40.00 MME [facility] [place]       Medical Decision Making    Medical Decision Making:   Initial Assessment:   Patient is a 43-year-old male with type 2 diabetes presenting to the emergency room complaining  hyperglycemia, polyuria, polydipsia.  Additionally complaining of left-sided hip pain that started today.  No trauma. New loss of control of bowel or bladder, no perirectal numbness, no new weakness of the lower extremity.  The  Differential Diagnosis:   DKA, hyperglycemia, acute kidney injury, electrolyte abnormality, pathologic fracture  Clinical Tests:   Lab Tests: Reviewed and Ordered  Radiological Study: Reviewed and Ordered  ED Management:  The patient had IV established.  He was given 2 L of normal saline.  Repeat Accu-Chek was obtained.  Labs were drawn and reviewed.  No DKA.  X-ray left hip was obtained. Patient was counseled on smoking.           Scribe Attestation:   Scribe #1: I performed the above scribed service and the documentation accurately describes the services I performed. I attest to the accuracy of the note.    Attending:   Physician Attestation Statement for Scribe #1: I, Kiersten Jackson DO, personally performed the services described in this documentation, as scribed by Annie Pope, in my presence, and it is both accurate and complete.          Clinical Impression       ICD-10-CM ICD-9-CM   1. Sciatica of left side M54.32 724.3   2. Hyperglycemia R73.9 790.29   3. Tobacco use Z72.0 305.1   4. Essential hypertension I10 401.9       Disposition:   Disposition: Discharged  Condition: Stable         Kiersten Jackson DO  08/02/18 0152

## 2018-08-02 NOTE — DISCHARGE INSTRUCTIONS
Driving or other activities under influence of medications - Patient and/or family/caretaker was given a prescription for, or instructed to use a medicine that may impair ability to drive, operate machinery, or participate in other potentially dangerous activities.  Patient was instructed not to participate in these activities while under the influence of these medications.